# Patient Record
Sex: MALE | Race: WHITE | NOT HISPANIC OR LATINO | URBAN - METROPOLITAN AREA
[De-identification: names, ages, dates, MRNs, and addresses within clinical notes are randomized per-mention and may not be internally consistent; named-entity substitution may affect disease eponyms.]

---

## 2017-12-13 NOTE — PATIENT DISCUSSION
Trial lenses dispensed to [facility] near.  DC 2 weeks prior to consult with Dr Christin Ignacio.

## 2018-03-13 ENCOUNTER — IMPORTED ENCOUNTER (OUTPATIENT)
Dept: URBAN - METROPOLITAN AREA CLINIC 50 | Facility: CLINIC | Age: 78
End: 2018-03-13

## 2018-04-02 ENCOUNTER — IMPORTED ENCOUNTER (OUTPATIENT)
Dept: URBAN - METROPOLITAN AREA CLINIC 50 | Facility: CLINIC | Age: 78
End: 2018-04-02

## 2019-09-24 NOTE — PATIENT DISCUSSION
The patient has narrow angles that are not occludable at this time. This should resolve with cataract surgery.

## 2019-09-30 NOTE — PROCEDURE NOTE: SURGICAL
"<span style=""font-weight:bold;"">MR #:</span> [id number]<br /><br /><span style=""font-weight:bold;"">PREOPERATIVE DIAGNOSIS:</span> Cataract

## 2019-10-01 NOTE — PATIENT DISCUSSION
Cataract surgery has been performed in the first eye and activities of daily living are still impaired. The patient would like to proceed with cataract surgery in the second eye as scheduled. The patient elects IOL OS Goal Basic + Emmetropia.

## 2019-10-07 NOTE — PROCEDURE NOTE: SURGICAL
MR #: [id number]<WF /><br />PREOPERATIVE DIAGNOSIS: Cataract, left eye.<br /><br />POSTOPERATIVE DIAGNOSIS: Same.<br /><br />OPERATIVE PROCEDURE: Phacoemulsification with +23.5 diopter Marshall &amp; Marshall AAB00, PC-IOL, left eye.<br /><br />PROCEDURE:&nbsp; Following sedation, Ashish Hicks CRNA administered topical anesthesia in the form of lidocaine 2% gel as per the anesthetic record. <br /><br />Prior to commencing surgery patient identification, surgical procedure, site, and side were confirmed by Dr. Onesimo Santana. &nbsp; The patient was then prepped with Betadine and draped with sterile drapes. A lid speculum was placed and the side port incision prepared. &nbsp; 0.5 cc of Epi-Shugarcaine was instilled and the anterior chamber entered at the temporal 180&deg; limbus in a single plane fashion employing a 2.7 mm trapezoid jarvis and ring fixation. Viscoelastic was placed, a circular capsulorhexis performed, hydrodissection accomplished and the nucleus emulsified within the posterior chamber. Cortex was aspirated with the I/A handpiece, the posterior capsule polished and viscoelastic instilled to distend the capsular sac. A +23.5 diopter Marshall [employer]amp; Amirah Mcardle was placed into the bag under direct visualization without difficulty employing the microinjector. Viscoelastic was aspirated with the I/A handpiece and the anterior chamber pressurized with BSS. The incision was tested for leaks and none were found. A single injection of 0.2 cc of Moxifloxacin was placed in the anterior chamber. The patient returned to the holding area having tolerated the procedure extremely well and without complication. <br /><br />Epi-shugarcaine consists of a mixture of 1cc epinephrine MPF 1:1000, 0.75 cc 4% lidocaine MPF and 2.25 cc BSS. Moxifloxacin consists of a mixture of Vigamox and BSS 1 mg/1 ml solution. <br /><br /><br />

## 2021-05-15 ASSESSMENT — VISUAL ACUITY
OD_BAT: 20/30
OS_CC: J1+
OS_SC: 20/25
OD_OTHER: 20/30. 20/50.
OD_SC: 20/30
OS_OTHER: 20/50. 20/60.
OD_CC: J1+
OS_BAT: 20/50

## 2021-05-15 ASSESSMENT — TONOMETRY
OD_IOP_MMHG: 13
OS_IOP_MMHG: 14